# Patient Record
Sex: FEMALE | Race: WHITE | NOT HISPANIC OR LATINO | Employment: FULL TIME | URBAN - METROPOLITAN AREA
[De-identification: names, ages, dates, MRNs, and addresses within clinical notes are randomized per-mention and may not be internally consistent; named-entity substitution may affect disease eponyms.]

---

## 2022-11-07 ENCOUNTER — HOSPITAL ENCOUNTER (EMERGENCY)
Facility: HOSPITAL | Age: 37
Discharge: HOME | End: 2022-11-07
Attending: EMERGENCY MEDICINE

## 2022-11-07 ENCOUNTER — APPOINTMENT (EMERGENCY)
Dept: RADIOLOGY | Facility: HOSPITAL | Age: 37
End: 2022-11-07

## 2022-11-07 VITALS
HEART RATE: 82 BPM | SYSTOLIC BLOOD PRESSURE: 115 MMHG | TEMPERATURE: 98.4 F | DIASTOLIC BLOOD PRESSURE: 69 MMHG | RESPIRATION RATE: 16 BRPM | WEIGHT: 87.3 LBS | OXYGEN SATURATION: 99 %

## 2022-11-07 DIAGNOSIS — K52.9 COLITIS: ICD-10-CM

## 2022-11-07 DIAGNOSIS — R11.2 NAUSEA VOMITING AND DIARRHEA: Primary | ICD-10-CM

## 2022-11-07 DIAGNOSIS — R19.7 NAUSEA VOMITING AND DIARRHEA: Primary | ICD-10-CM

## 2022-11-07 LAB
ALBUMIN SERPL BCP-MCNC: 4.1 G/DL (ref 3.5–5)
ALP SERPL-CCNC: 67 U/L (ref 46–116)
ALT SERPL W P-5'-P-CCNC: 25 U/L (ref 12–78)
ANION GAP SERPL CALCULATED.3IONS-SCNC: 13 MMOL/L (ref 4–13)
BACTERIA UR QL AUTO: ABNORMAL /HPF
BASOPHILS # BLD AUTO: 0.04 THOUSANDS/ÂΜL (ref 0–0.1)
BASOPHILS NFR BLD AUTO: 0 % (ref 0–1)
BILIRUB SERPL-MCNC: 0.34 MG/DL (ref 0.2–1)
BILIRUB UR QL STRIP: NEGATIVE
BUN SERPL-MCNC: 4 MG/DL (ref 5–25)
CALCIUM SERPL-MCNC: 8.8 MG/DL (ref 8.3–10.1)
CHLORIDE SERPL-SCNC: 102 MMOL/L (ref 96–108)
CLARITY UR: ABNORMAL
CO2 SERPL-SCNC: 25 MMOL/L (ref 21–32)
COLOR UR: ABNORMAL
CREAT SERPL-MCNC: 0.73 MG/DL (ref 0.6–1.3)
EOSINOPHIL # BLD AUTO: 0.09 THOUSAND/ÂΜL (ref 0–0.61)
EOSINOPHIL NFR BLD AUTO: 1 % (ref 0–6)
ERYTHROCYTE [DISTWIDTH] IN BLOOD BY AUTOMATED COUNT: 12.8 % (ref 11.6–15.1)
EXT PREG TEST URINE: NORMAL
EXT. CONTROL ED NAV: NORMAL
GFR SERPL CREATININE-BSD FRML MDRD: 107 ML/MIN/1.73SQ M
GLUCOSE SERPL-MCNC: 83 MG/DL (ref 65–140)
GLUCOSE UR STRIP-MCNC: NEGATIVE MG/DL
HCT VFR BLD AUTO: 39.2 % (ref 34.8–46.1)
HGB BLD-MCNC: 12.8 G/DL (ref 11.5–15.4)
HGB UR QL STRIP.AUTO: ABNORMAL
IMM GRANULOCYTES # BLD AUTO: 0.04 THOUSAND/UL (ref 0–0.2)
IMM GRANULOCYTES NFR BLD AUTO: 0 % (ref 0–2)
KETONES UR STRIP-MCNC: NEGATIVE MG/DL
LEUKOCYTE ESTERASE UR QL STRIP: NEGATIVE
LIPASE SERPL-CCNC: 652 U/L (ref 73–393)
LYMPHOCYTES # BLD AUTO: 2.38 THOUSANDS/ÂΜL (ref 0.6–4.47)
LYMPHOCYTES NFR BLD AUTO: 18 % (ref 14–44)
MCH RBC QN AUTO: 29 PG (ref 26.8–34.3)
MCHC RBC AUTO-ENTMCNC: 32.7 G/DL (ref 31.4–37.4)
MCV RBC AUTO: 89 FL (ref 82–98)
MONOCYTES # BLD AUTO: 1.06 THOUSAND/ÂΜL (ref 0.17–1.22)
MONOCYTES NFR BLD AUTO: 8 % (ref 4–12)
NEUTROPHILS # BLD AUTO: 9.37 THOUSANDS/ÂΜL (ref 1.85–7.62)
NEUTS SEG NFR BLD AUTO: 73 % (ref 43–75)
NITRITE UR QL STRIP: NEGATIVE
NON-SQ EPI CELLS URNS QL MICRO: ABNORMAL /HPF
NRBC BLD AUTO-RTO: 0 /100 WBCS
PH UR STRIP.AUTO: 6 [PH]
PLATELET # BLD AUTO: 364 THOUSANDS/UL (ref 149–390)
PMV BLD AUTO: 9.8 FL (ref 8.9–12.7)
POTASSIUM SERPL-SCNC: 3.5 MMOL/L (ref 3.5–5.3)
PROT SERPL-MCNC: 7.7 G/DL (ref 6.4–8.4)
PROT UR STRIP-MCNC: NEGATIVE MG/DL
RBC # BLD AUTO: 4.41 MILLION/UL (ref 3.81–5.12)
RBC #/AREA URNS AUTO: ABNORMAL /HPF
SARS-COV-2 RNA RESP QL NAA+PROBE: NEGATIVE
SODIUM SERPL-SCNC: 140 MMOL/L (ref 135–147)
SP GR UR STRIP.AUTO: <=1.005 (ref 1–1.03)
UROBILINOGEN UR QL STRIP.AUTO: 0.2 E.U./DL
WBC # BLD AUTO: 12.98 THOUSAND/UL (ref 4.31–10.16)
WBC #/AREA URNS AUTO: ABNORMAL /HPF

## 2022-11-07 RX ORDER — ONDANSETRON 2 MG/ML
4 INJECTION INTRAMUSCULAR; INTRAVENOUS ONCE
Status: DISCONTINUED | OUTPATIENT
Start: 2022-11-07 | End: 2022-11-07 | Stop reason: HOSPADM

## 2022-11-07 RX ORDER — ONDANSETRON 4 MG/1
4 TABLET, FILM COATED ORAL EVERY 6 HOURS PRN
Qty: 20 TABLET | Refills: 0 | Status: SHIPPED | OUTPATIENT
Start: 2022-11-07

## 2022-11-07 RX ADMIN — SODIUM CHLORIDE 1000 ML: 0.9 INJECTION, SOLUTION INTRAVENOUS at 16:27

## 2022-11-07 RX ADMIN — IOHEXOL 100 ML: 350 INJECTION, SOLUTION INTRAVENOUS at 18:13

## 2022-11-07 NOTE — Clinical Note
Laitheric Jacobs was seen and treated in our emergency department on 11/7/2022  Diagnosis:     Goran Hayes  may return to school on return date  She may return on this date: 11/09/2022         If you have any questions or concerns, please don't hesitate to call        Chata Arrieta RN    ______________________________           _______________          _______________  Hospital Representative                              Date                                Time

## 2022-11-07 NOTE — ED PROVIDER NOTES
History  Chief Complaint   Patient presents with   • Vomiting     Exposed to  at home that tested positive for covid on oct 30, states she has been feeling sick since about the 29th  Primary symptoms are vomiting and diarrhea     31 yo female c/o vomiting and diarrhea that started 10 days ago  Was ok for a few days then got worse and is having nausea every day with vomiting and watery stools at night  She is concerned about a blockage because she feels like the stools are "coming out a pinhole"  She has a history of groin hernia repair in the past   She says she is not eating and can't hold anything down  She has lost weight - normally weighs 95 pounds - but she has had this weight loss before and her PCP just does bloodwork and nothing else  No fever   did have covid recently but she says home test is negative  History provided by:  Patient   used: No    Vomiting  Associated symptoms: diarrhea    Associated symptoms: no abdominal pain, no cough, no fever, no headaches and no myalgias        None       Past Medical History:   Diagnosis Date   • IBS (irritable bowel syndrome)        Past Surgical History:   Procedure Laterality Date   • APPENDECTOMY         No family history on file  I have reviewed and agree with the history as documented  E-Cigarette/Vaping   • E-Cigarette Use Never User      E-Cigarette/Vaping Substances     Social History     Tobacco Use   • Smoking status: Never Smoker   • Smokeless tobacco: Never Used   Vaping Use   • Vaping Use: Never used   Substance Use Topics   • Alcohol use: Yes     Comment: rare       Review of Systems   Constitutional: Negative  Negative for fever  HENT: Negative  Eyes: Negative  Respiratory: Negative  Negative for cough and shortness of breath  Cardiovascular: Negative  Negative for chest pain  Gastrointestinal: Positive for diarrhea, nausea and vomiting  Negative for abdominal pain     Genitourinary: Negative  Negative for dysuria and flank pain  Musculoskeletal: Negative  Negative for back pain and myalgias  Skin: Negative  Negative for rash  Neurological: Negative  Negative for dizziness and headaches  Hematological: Does not bruise/bleed easily  Psychiatric/Behavioral: Negative  All other systems reviewed and are negative  Physical Exam  Physical Exam  Vitals and nursing note reviewed  Constitutional:       General: She is not in acute distress  Appearance: She is well-developed  She is not ill-appearing or diaphoretic  Comments: Weight 87 pounds   HENT:      Head: Normocephalic and atraumatic  Right Ear: External ear normal       Left Ear: External ear normal    Eyes:      General: No scleral icterus  Conjunctiva/sclera: Conjunctivae normal    Cardiovascular:      Rate and Rhythm: Normal rate and regular rhythm  Heart sounds: Normal heart sounds  No murmur heard  Pulmonary:      Effort: Pulmonary effort is normal  No respiratory distress  Breath sounds: Normal breath sounds  Abdominal:      General: Bowel sounds are normal  There is no distension  Palpations: Abdomen is soft  Tenderness: There is no abdominal tenderness  There is no right CVA tenderness, left CVA tenderness or guarding  Musculoskeletal:         General: No deformity  Normal range of motion  Cervical back: Normal range of motion and neck supple  Right lower leg: No edema  Left lower leg: No edema  Skin:     General: Skin is warm and dry  Coloration: Skin is not pale  Findings: No rash  Neurological:      General: No focal deficit present  Mental Status: She is alert and oriented to person, place, and time  Cranial Nerves: No cranial nerve deficit     Psychiatric:         Mood and Affect: Mood normal          Behavior: Behavior normal          Vital Signs  ED Triage Vitals [11/07/22 1338]   Temperature Pulse Respirations Blood Pressure SpO2 98 4 °F (36 9 °C) 78 16 122/91 100 %      Temp Source Heart Rate Source Patient Position - Orthostatic VS BP Location FiO2 (%)   Tympanic Monitor Sitting Right arm --      Pain Score       No Pain           Vitals:    11/07/22 1338 11/07/22 1937   BP: 122/91 115/69   Pulse: 78 82   Patient Position - Orthostatic VS: Sitting Lying         Visual Acuity      ED Medications  Medications   ondansetron (ZOFRAN) injection 4 mg (4 mg Intravenous Not Given 11/7/22 1637)   sodium chloride 0 9 % bolus 1,000 mL (0 mL Intravenous Stopped 11/7/22 1935)   barium (READI-CAT 2) suspension 900 mL (900 mL Oral Given 11/7/22 1633)   iohexol (OMNIPAQUE) 350 MG/ML injection (SINGLE-DOSE) 100 mL (100 mL Intravenous Given 11/7/22 1813)       Diagnostic Studies  Results Reviewed     Procedure Component Value Units Date/Time    Comprehensive metabolic panel [[id number]]  (Abnormal) Collected: 11/07/22 1629    Lab Status: Final result Specimen: Blood Updated: 11/07/22 1721     Sodium 140 mmol/L      Potassium 3 5 mmol/L      Chloride 102 mmol/L      CO2 25 mmol/L      ANION GAP 13 mmol/L      BUN 4 mg/dL      Creatinine 0 73 mg/dL      Glucose 83 mg/dL      Calcium 8 8 mg/dL      AST --     ALT 25 U/L      Alkaline Phosphatase 67 U/L      Total Protein 7 7 g/dL      Albumin 4 1 g/dL      Total Bilirubin 0 34 mg/dL      eGFR 107 ml/min/1 73sq m     Narrative:      [place] guidelines for Chronic Kidney Disease (CKD):   •  Stage 1 with normal or high GFR (GFR > 90 mL/min/1 73 square meters)  •  Stage 2 Mild CKD (GFR = 60-89 mL/min/1 73 square meters)  •  Stage 3A Moderate CKD (GFR = 45-59 mL/min/1 73 square meters)  •  Stage 3B Moderate CKD (GFR = 30-44 mL/min/1 73 square meters)  •  Stage 4 Severe CKD (GFR = 15-29 mL/min/1 73 square meters)  •  Stage 5 End Stage CKD (GFR <15 mL/min/1 73 square meters)  Note: GFR calculation is accurate only with a steady state creatinine    COVID only [[id number]]  (Normal) Collected: 11/07/22 1629    Lab Status: Final result Specimen: Nares from Nose Updated: 11/07/22 1716     SARS-CoV-2 Negative    Narrative:      FOR PEDIATRIC PATIENTS - copy/paste COVID Guidelines URL to browser: [email or web address]    SARS-CoV-2 assay is a Nucleic Acid Amplification assay intended for the  qualitative detection of nucleic acid from SARS-CoV-2 in nasopharyngeal  swabs  Results are for the presumptive identification of SARS-CoV-2 RNA  Positive results are indicative of infection with SARS-CoV-2, the virus  causing COVID-19, but do not rule out bacterial infection or co-infection  with other viruses  Laboratories within the [place] and its  territories are required to report all positive results to the appropriate  public health authorities  Negative results do not preclude SARS-CoV-2  infection and should not be used as the sole basis for treatment or other  patient management decisions  Negative results must be combined with  clinical observations, patient history, and epidemiological information  This test has not been FDA cleared or approved  This test has been authorized by FDA under an Emergency Use Authorization  (EUA)  This test is only authorized for the duration of time the  declaration that circumstances exist justifying the authorization of the  emergency use of an in vitro diagnostic tests for detection of SARS-CoV-2  virus and/or diagnosis of COVID-19 infection under section 564(b)(1) of  the Act, 21 U  S C  505FVH-4(F)(6), unless the authorization is terminated  or revoked sooner  The test has been validated but independent review by FDA  and CLIA is pending  Test performed using [employer] Gene[employer]pert: This RT-PCR assay targets N2,  a region unique to SARS-CoV-2  A conserved region in the E-gene was chosen  for pan-Sarbecovirus detection which includes SARS-CoV-2      According to CMS-2020-01-R, this platform meets the definition of high-throughput technology      Urine Microscopic [[id number]]  (Abnormal) Collected: 11/07/22 1641    Lab Status: Final result Specimen: Urine, Clean Catch Updated: 11/07/22 1711     RBC, UA 2-4 /hpf      WBC, UA 1-2 /hpf      Epithelial Cells Moderate /hpf      Bacteria, UA Occasional /hpf     UA (URINE) with reflex to Scope [[id number]]  (Abnormal) Collected: 11/07/22 1641    Lab Status: Final result Specimen: Urine, Clean Catch Updated: 11/07/22 1701     Color, UA Light Yellow     Clarity, UA Slightly Cloudy     Specific Gravity, UA <=1 005     pH, UA 6 0     Leukocytes, UA Negative     Nitrite, UA Negative     Protein, UA Negative mg/dl      Glucose, UA Negative mg/dl      Ketones, UA Negative mg/dl      Urobilinogen, UA 0 2 E U /dl      Bilirubin, UA Negative     Occult Blood, UA Small    Lipase [[id number]]  (Abnormal) Collected: 11/07/22 1629    Lab Status: Final result Specimen: Blood Updated: 11/07/22 1656     Lipase 652 u/L     CBC and differential [[id number]]  (Abnormal) Collected: 11/07/22 1629    Lab Status: Final result Specimen: Blood Updated: 11/07/22 1638     WBC 12 98 Thousand/uL      RBC 4 41 Million/uL      Hemoglobin 12 8 g/dL      Hematocrit 39 2 %      MCV 89 fL      MCH 29 0 pg      MCHC 32 7 g/dL      RDW 12 8 %      MPV 9 8 fL      Platelets 240 Thousands/uL      nRBC 0 /100 WBCs      Neutrophils Relative 73 %      Immat GRANS % 0 %      Lymphocytes Relative 18 %      Monocytes Relative 8 %      Eosinophils Relative 1 %      Basophils Relative 0 %      Neutrophils Absolute 9 37 Thousands/µL      Immature Grans Absolute 0 04 Thousand/uL      Lymphocytes Absolute 2 38 Thousands/µL      Monocytes Absolute 1 06 Thousand/µL      Eosinophils Absolute 0 09 Thousand/µL      Basophils Absolute 0 04 Thousands/µL     POCT pregnancy, urine [[id number]]  (Normal) Resulted: 11/07/22 1635    Lab Status: Final result Updated: 11/07/22 1635     EXT PREG TEST UR (Ref: Negative) nrgative     Control valid                 CT abdomen pelvis with contrast   Final Result by Josee Friedman DO (11/07 1947)   Partially liquefied stool throughout the large and small bowel  Some minimal mucosal enhancement sigmoid and rectum which may suggest some mild colitis  No bowel wall pneumatosis or mesenteric/portal venous gas to suggest bowel ischemia or bowel    infarct  No significant bowel dilatation to suggest bowel obstruction  Consider follow-up abdominal x-ray in 12 hours to confirm passage of oral contrast material          Workstation performed: [id number]                    Procedures  Procedures         ED Course                                             MDM  Number of Diagnoses or Management Options  Colitis  Nausea vomiting and diarrhea  Diagnosis management comments: CT scan negative for obstruction, shows diarrheal disease/mild colitis  Will discharge, do outpt  Stool cultures since symptoms going on for 10 days already and advised follow up outpt  GI and PCP  Disposition  Final diagnoses:   Nausea vomiting and diarrhea   Colitis     Time reflects when diagnosis was documented in both MDM as applicable and the Disposition within this note     Time User Action Codes Description Comment    56/9/2273  5:31 PM Love Borne Add [M94 3,  R19 7] Nausea vomiting and diarrhea     56/6/7917  4:09 PM Love Borne Add [G19 8] Colitis       ED Disposition     ED Disposition   Discharge    Condition   Stable    Date/Time   Mon Nov 7, 2022  8:01 PM    [address] 29 discharge to home/self care                 Follow-up Information     Follow up With Specialties Details Why Contact Info    Luisa Daniels MD Gastroenterology Schedule an appointment as soon as possible for a visit   [place] [id number]  [phone]            Patient's Medications   Discharge Prescriptions    ONDANSETRON (ZOFRAN) 4 MG TABLET    Take 1 tablet (4 mg total) by mouth every 6 (six) hours as needed for nausea or vomiting       Start Date: 11/7/2022 End Date: --       Order Dose: 4 mg       Quantity: 20 tablet    Refills: 0       Outpatient Discharge Orders   Stool Enteric Bacterial Panel by PCR   Standing Status: Future Standing Exp   Date: 11/07/23       PDMP Review     None          ED Provider  Electronically Signed by           Melissa Huitron MD  35/47/98 5534

## 2022-11-07 NOTE — ED NOTES
Admits to smoking marijuana almost every day, informed that this may cause her to vomit       Rafi Gatica, MIKY  11/07/22 8817

## 2022-11-08 ENCOUNTER — APPOINTMENT (OUTPATIENT)
Dept: LAB | Facility: HOSPITAL | Age: 37
End: 2022-11-08

## 2022-11-08 DIAGNOSIS — K52.9 COLITIS: ICD-10-CM

## 2022-11-08 NOTE — DISCHARGE INSTRUCTIONS
Attempted to call patient. Patients  answered the phone and stated that patient is unavailable. She is at work until 5. Patients  stated that he will try to get ahold of her and have her give our office a call.    Your CT scan shows diarrheal disease with mild colitis  Try to keep hydrated and take in some nutrition  Use immodium as needed for diarrhea and the zofra as needed for nausea  Make a follow up appointment with your doctor and the GI specialist for further evaluation beyond what we can do in the ER

## 2022-11-09 LAB
CAMPYLOBACTER DNA SPEC NAA+PROBE: NORMAL
SALMONELLA DNA SPEC QL NAA+PROBE: NORMAL
SHIGA TOXIN STX GENE SPEC NAA+PROBE: NORMAL
SHIGELLA DNA SPEC QL NAA+PROBE: NORMAL

## 2023-05-05 ENCOUNTER — OFFICE VISIT (OUTPATIENT)
Dept: ENDOCRINOLOGY | Facility: CLINIC | Age: 38
End: 2023-05-05

## 2023-05-05 VITALS
SYSTOLIC BLOOD PRESSURE: 110 MMHG | HEART RATE: 67 BPM | DIASTOLIC BLOOD PRESSURE: 70 MMHG | WEIGHT: 96.6 LBS | BODY MASS INDEX: 16.49 KG/M2 | HEIGHT: 64 IN

## 2023-05-05 DIAGNOSIS — R63.6 UNDERWEIGHT: ICD-10-CM

## 2023-05-05 DIAGNOSIS — Z83.49 FAMILY HISTORY OF THYROID DISEASE: ICD-10-CM

## 2023-05-05 DIAGNOSIS — Z86.32 HISTORY OF GESTATIONAL DIABETES: ICD-10-CM

## 2023-05-05 DIAGNOSIS — R61 NIGHT SWEATS: Primary | ICD-10-CM

## 2023-05-05 PROBLEM — Z87.59 H/O PRE-ECLAMPSIA: Status: ACTIVE | Noted: 2023-05-05

## 2023-05-05 RX ORDER — FAMCICLOVIR 500 MG/1
TABLET ORAL
COMMUNITY
Start: 2023-02-16

## 2023-05-05 NOTE — PROGRESS NOTES
" Andre Gomes 41 y o  female MRN: [id number]    Encounter: [id number]      Assessment/Plan     1  Underweight, BMI 16 8, difficulty in gaining weight  2  Night sweats  3  Family History of thyroid disease  -will check thyroid function test along with thyroid antibodies, CBC, CMP  -Check A1c  If all labs are within normal limits, it would be important to check thyroid function test during a period that patient has loss of appetite, worsening of night sweats, insomnia, weight loss    5  History of gestational diabetes mellitus, preeclampsia    CC:  Night sweats, difficulty gaining weight     History of Present Illness     HPI:  Andre Gomes is a 41 y o  female who is here for a new consult for concerns related to the thyroid     Since her early 25s has noticed  Period where she has loss of appetite, night sweats, insomnia  weight loss 95 -- > 85 lbs , takes af ew months to get back to \"normal\"   approx   Has not been able to gain weight expect for during pregnancy when she was max 130 lbs     approx once a year, sometimes more frequent  Last 1 5 year ago   Has never been to an endocrinologist   In Nov 2022 - lost weight due to a GI infection  Right now appetite is good, weight stable,   Sleep has been worse well since her pregnancy, delivered in 2018   hair thinning, Brittle nails   No swelling in the neck  (felt is was swollen to patient in 2019, got ok after on its own in 1 5 weeks)   No obstructive symptoms   Itchy, watery eyes; at times feels like there is a trail behind whatever object she is seeing   No peripheral vision loss  night sweats are more constant 4-5 times a week   Occasional headaches     No excessive iodine   Eats 2-3 meals a day   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, is active; no additional exercise     IUD in place and has not had a period recently   Had regular cycles prior, had dysmenorrhea    No difficulty in conceiving, had GD and pre-eclampsia during " "pregnancy; no history of essential hypertension    Mother with h/op hypothyroidism   Maternal aunty and GM also with thyroid disease   No Thyroid cancer    All other systems were reviewed and are negative  Review of Systems    Historical Information   Past Medical History:   Diagnosis Date    IBS (irritable bowel syndrome)      Past Surgical History:   Procedure Laterality Date    APPENDECTOMY       Social History   Social History     Substance and Sexual Activity   Alcohol Use Yes    Comment: rare     Social History     Substance and Sexual Activity   Drug Use Yes    Types: Marijuana    Comment: almost daily     Social History     Tobacco Use   Smoking Status Never   Smokeless Tobacco Never     Family History:   Family History   Problem Relation Age of Onset    Hypothyroidism Mother     Hypertension Father        Meds/Allergies   Current Outpatient Medications   Medication Sig Dispense Refill    famciclovir (FAMVIR) 500 mg tablet TAKE 3 TABLETS BY ORAL ROUTE ONCE AT THE FIRST SIGN OF OUTBREAK      ondansetron (ZOFRAN) 4 mg tablet Take 1 tablet (4 mg total) by mouth every 6 (six) hours as needed for nausea or vomiting (Patient not taking: Reported on 5/5/2023) 20 tablet 0     No current facility-administered medications for this visit  No Known Allergies    Objective   Vitals: Blood pressure 110/70, pulse 67, height 5' 3 5\" (1 613 m), weight 43 8 kg (96 lb 9 6 oz)  Physical Exam  Constitutional:       Appearance: She is well-developed  HENT:      Head: Normocephalic and atraumatic  Eyes:      Conjunctiva/sclera: Conjunctivae normal       Pupils: Pupils are equal, round, and reactive to light  Neck:      Thyroid: No thyromegaly  Comments: No thyromegaly   Nontender, no nodules appreciated on palpation   Cardiovascular:      Rate and Rhythm: Normal rate and regular rhythm  Heart sounds: Normal heart sounds  No murmur heard    Pulmonary:      Effort: Pulmonary effort is normal       " "Breath sounds: Normal breath sounds  No wheezing  Abdominal:      General: There is no distension  Palpations: Abdomen is soft  Tenderness: There is no abdominal tenderness  Musculoskeletal:         General: Normal range of motion  Cervical back: Normal range of motion and neck supple  Skin:     General: Skin is warm  Neurological:      Mental Status: She is alert and oriented to person, place, and time  Deep Tendon Reflexes: Reflexes normal       Comments: No tremors on the outstretched arms    Psychiatric:         Behavior: Behavior normal         The history was obtained from the review of the chart, patient  Lab Results:      Lab Results   Component Value Date    WBC 12 98 (H) 11/07/2022    HGB 12 8 11/07/2022    HCT 39 2 11/07/2022    MCV 89 11/07/2022     11/07/2022     Lab Results   Component Value Date    CREATININE 0 73 11/07/2022    BUN 4 (L) 11/07/2022    K 3 5 11/07/2022     11/07/2022    CO2 25 11/07/2022     No results found for: HGBA1C      Imaging Studies:       I have personally reviewed pertinent reports  Portions of the record may have been created with voice recognition software  Occasional wrong word or \"sound a like\" substitutions may have occurred due to the inherent limitations of voice recognition software  Read the chart carefully and recognize, using context, where substitutions have occurred      "

## 2023-05-13 LAB
ALBUMIN SERPL-MCNC: 4.8 G/DL (ref 3.8–4.8)
ALBUMIN/GLOB SERPL: 2.5 {RATIO} (ref 1.2–2.2)
ALP SERPL-CCNC: 44 IU/L (ref 44–121)
ALT SERPL-CCNC: 19 IU/L (ref 0–32)
AST SERPL-CCNC: 21 IU/L (ref 0–40)
BASOPHILS # BLD AUTO: 0 X10E3/UL (ref 0–0.2)
BASOPHILS NFR BLD AUTO: 0 %
BILIRUB SERPL-MCNC: 0.4 MG/DL (ref 0–1.2)
BUN SERPL-MCNC: 12 MG/DL (ref 6–20)
BUN/CREAT SERPL: 16 (ref 9–23)
CALCIUM SERPL-MCNC: 9 MG/DL (ref 8.7–10.2)
CHLORIDE SERPL-SCNC: 103 MMOL/L (ref 96–106)
CO2 SERPL-SCNC: 22 MMOL/L (ref 20–29)
CREAT SERPL-MCNC: 0.74 MG/DL (ref 0.57–1)
EGFR: 107 ML/MIN/1.73
EOSINOPHIL # BLD AUTO: 0.1 X10E3/UL (ref 0–0.4)
EOSINOPHIL NFR BLD AUTO: 1 %
ERYTHROCYTE [DISTWIDTH] IN BLOOD BY AUTOMATED COUNT: 12.9 % (ref 11.7–15.4)
GLOBULIN SER-MCNC: 1.9 G/DL (ref 1.5–4.5)
GLUCOSE SERPL-MCNC: 81 MG/DL (ref 70–99)
HCT VFR BLD AUTO: 36.9 % (ref 34–46.6)
HGB BLD-MCNC: 12.3 G/DL (ref 11.1–15.9)
IMM GRANULOCYTES # BLD: 0 X10E3/UL (ref 0–0.1)
IMM GRANULOCYTES NFR BLD: 0 %
LYMPHOCYTES # BLD AUTO: 1.7 X10E3/UL (ref 0.7–3.1)
LYMPHOCYTES NFR BLD AUTO: 28 %
MCH RBC QN AUTO: 29.4 PG (ref 26.6–33)
MCHC RBC AUTO-ENTMCNC: 33.3 G/DL (ref 31.5–35.7)
MCV RBC AUTO: 88 FL (ref 79–97)
MONOCYTES # BLD AUTO: 0.4 X10E3/UL (ref 0.1–0.9)
MONOCYTES NFR BLD AUTO: 6 %
NEUTROPHILS # BLD AUTO: 4 X10E3/UL (ref 1.4–7)
NEUTROPHILS NFR BLD AUTO: 65 %
PLATELET # BLD AUTO: 278 X10E3/UL (ref 150–450)
POTASSIUM SERPL-SCNC: 4.7 MMOL/L (ref 3.5–5.2)
PROT SERPL-MCNC: 6.7 G/DL (ref 6–8.5)
RBC # BLD AUTO: 4.18 X10E6/UL (ref 3.77–5.28)
SODIUM SERPL-SCNC: 139 MMOL/L (ref 134–144)
T3 SERPL-MCNC: 99 NG/DL (ref 71–180)
T4 FREE SERPL-MCNC: 1.11 NG/DL (ref 0.82–1.77)
THYROGLOB AB SERPL-ACNC: 21.3 IU/ML (ref 0–0.9)
THYROPEROXIDASE AB SERPL-ACNC: <9 IU/ML (ref 0–34)
TSH SERPL DL<=0.005 MIU/L-ACNC: 1.35 UIU/ML (ref 0.45–4.5)
TSI SER-ACNC: <0.1 IU/L (ref 0–0.55)
WBC # BLD AUTO: 6.2 X10E3/UL (ref 3.4–10.8)

## 2023-05-16 ENCOUNTER — TELEPHONE (OUTPATIENT)
Dept: ENDOCRINOLOGY | Facility: CLINIC | Age: 38
End: 2023-05-16

## 2023-05-16 DIAGNOSIS — Z83.49 FAMILY HISTORY OF THYROID DISEASE: Primary | ICD-10-CM

## 2023-05-16 NOTE — TELEPHONE ENCOUNTER
Though less likely, to further evaluate night sweats, please order plasma catecholamines, metanephrines  Also check vitamin D level   Recommend following up with primary care regarding shortness of breath while singing  If it is voice change, may need to see ENT

## 2023-05-16 NOTE — TELEPHONE ENCOUNTER
Spoke with patient and reviewed lab results  She understood    She is wondering if there is something that can be done for the night sweats and excessive tiredness  She is also SOB when she sings which she does a lot at work

## 2023-05-17 DIAGNOSIS — R61 NIGHT SWEATS: Primary | ICD-10-CM

## 2023-05-17 DIAGNOSIS — E55.9 VITAMIN D DEFICIENCY: ICD-10-CM

## 2023-05-17 NOTE — TELEPHONE ENCOUNTER
Spoke with patient and reviewed recommendations and ordered labs  She understood    Wanted you to be aware that a cousin of hers had a rare thyroid cancer  Not sure if that would be relevant

## 2023-05-19 DIAGNOSIS — Z83.49 FAMILY HISTORY OF THYROID DISEASE: Primary | ICD-10-CM

## 2023-05-19 NOTE — TELEPHONE ENCOUNTER
Please ask patient if she knows details of what kind of thyroid cancer it was  Please add on calcitonin, ESR/CRP to the additional labs to be done   She should also follow up with her primary care physician

## 2023-05-31 LAB
25(OH)D3+25(OH)D2 SERPL-MCNC: 42.6 NG/ML (ref 30–100)
CALCIT SERPL-MCNC: <2 PG/ML (ref 0–5)
CRP SERPL-MCNC: <1 MG/L (ref 0–10)
DOPAMINE 24H UR-MRATE: <30 PG/ML (ref 0–48)
EPINEPH PLAS-MCNC: 34 PG/ML (ref 0–62)
ERYTHROCYTE [SEDIMENTATION RATE] IN BLOOD BY WESTERGREN METHOD: 9 MM/HR (ref 0–32)
METANEPH FREE SERPL-MCNC: 28.9 PG/ML (ref 0–88)
NOREPINEPH PLAS-MCNC: 285 PG/ML (ref 0–874)
NORMETANEPHRINE SERPL-MCNC: 69.8 PG/ML (ref 0–210.1)

## 2023-11-01 LAB
T4 FREE SERPL-MCNC: 1.29 NG/DL (ref 0.82–1.77)
TSH SERPL DL<=0.005 MIU/L-ACNC: 2.03 UIU/ML (ref 0.45–4.5)

## 2023-11-14 ENCOUNTER — OFFICE VISIT (OUTPATIENT)
Dept: ENDOCRINOLOGY | Facility: CLINIC | Age: 38
End: 2023-11-14
Payer: COMMERCIAL

## 2023-11-14 VITALS
WEIGHT: 95 LBS | SYSTOLIC BLOOD PRESSURE: 122 MMHG | HEART RATE: 93 BPM | OXYGEN SATURATION: 95 % | DIASTOLIC BLOOD PRESSURE: 82 MMHG | BODY MASS INDEX: 16.56 KG/M2

## 2023-11-14 DIAGNOSIS — R53.83 OTHER FATIGUE: ICD-10-CM

## 2023-11-14 DIAGNOSIS — E06.3 HASHIMOTO'S THYROIDITIS: ICD-10-CM

## 2023-11-14 DIAGNOSIS — R63.6 UNDERWEIGHT: ICD-10-CM

## 2023-11-14 DIAGNOSIS — R61 NIGHT SWEATS: Primary | ICD-10-CM

## 2023-11-14 PROCEDURE — 99213 OFFICE O/P EST LOW 20 MIN: CPT | Performed by: NURSE PRACTITIONER

## 2023-11-14 NOTE — ASSESSMENT & PLAN NOTE
Recommended follow-up with GI for symptoms of intermittent pain after swallowing and decreased appetite if endocrine work-up is inconclusive for the symptoms. Follow-up in 3 months with the above work-up.

## 2023-11-14 NOTE — PROGRESS NOTES
Established Patient Progress Note       CC: Night sweats, difficulty gaining weight       Impression & Plan:    Problem List Items Addressed This Visit          Endocrine    Hashimoto's thyroiditis     History of positive thyroid antibodies. Biochemically euthyroid from October 2023. Relevant Orders    TSH, 3rd generation    T4, free- Lab Collect       Other    Night sweats - Primary     We will repeat metanephrines and catecholamines in a period of symptoms. Relevant Orders    Metanephrine, Fractionated Plasma Free    Catecholamines, fractionated, plasma    Underweight     Recommended follow-up with GI for symptoms of intermittent pain after swallowing and decreased appetite if endocrine work-up is inconclusive for the symptoms. Follow-up in 3 months with the above work-up. Other Visit Diagnoses       Other fatigue        Relevant Orders    Iron Panel (Includes Ferritin, Iron Sat%, Iron, and TIBC)            Orders Placed This Encounter   Procedures    Metanephrine, Fractionated Plasma Free     This is a Patient Instruction: This test requires patient fasting for 10-12 hours or longer. The patient should be laying down for at least 15 minutes before and during sample collection. Standing Status:   Future     Standing Expiration Date:   11/14/2024    Catecholamines, fractionated, plasma     This is a patient instruction: This test requires fasting for four or more hours without smoking. Avoid walnuts, bananas and [place] one week prior to test collection. Other drug interference may occur including epinephrine and epinephrine-like drugs (eg, nose drops, sinus and cough preparations, bronchodilators, appetite suppressants). Test is unreliable in subjects on levodopa or methenamine mandelate. Avoid patient stress. Patient should lie down in quiet surroundings for at least 30 minutes before and during sample collection.      Standing Status:   Future     Standing Expiration Date: 11/14/2024    TSH, 3rd generation     This is a patient instruction: This test is non-fasting. Please drink two glasses of water morning of bloodwork. Standing Status:   Future     Standing Expiration Date:   5/14/2024    T4, free- Lab Collect     Standing Status:   Future     Standing Expiration Date:   11/14/2024       History of Present Illness:     Mikael Nesbitt is a 41 y.o. female presents for follow-up of concerns related to thyroid function after initial consultation with Dr. Katlyn Dickerson in May 2023. Some of the concerns that she has been experiencing is that since her early 25s she has noticed loss of appetite, night sweats, insomnia The only time where she was able to gain weight was during pregnancy where she was at maximum 130 pounds. Currently at 95 pounds. Maternal family history of thyroid disease. Denies personal or family history of thyroid cancer. Continues to experience periods with night sweats, headaches, feeling irritable, and hot flashes. Has current baseline weight of 95 pounds however has been down as low as 85 pounds while experiencing periods of the above symptoms. She notes that she has some decreased appetite and occasionally after eating she will experience a little bit of midsternal chest comfort within a few minutes of eating. She has not been followed by GI recently however did have an episode similar in 2015 per patient report where she did have an EGD and colonoscopy which did not identify any anatomical cause other than tonsil stones. She did have a modified barium swallow as well in 2017 with an unremarkable exam.  The EGD and GI records records are not available in our electronic health system.     Has IUD, no regular periods     Patient Active Problem List   Diagnosis    Night sweats    Underweight    Family history of thyroid disease    H/O pre-eclampsia    History of gestational diabetes    Hashimoto's thyroiditis      Past Medical History:   Diagnosis Date    IBS (irritable bowel syndrome)       Past Surgical History:   Procedure Laterality Date    APPENDECTOMY        Family History   Problem Relation Age of Onset    Hypothyroidism Mother     Hypertension Father      Social History     Tobacco Use    Smoking status: Never    Smokeless tobacco: Never   Substance Use Topics    Alcohol use: Yes     Comment: rare     No Known Allergies    Current Outpatient Medications:     famciclovir (FAMVIR) 500 mg tablet, TAKE 3 TABLETS BY ORAL ROUTE ONCE AT THE FIRST SIGN OF OUTBREAK, Disp: , Rfl:     ondansetron (ZOFRAN) 4 mg tablet, Take 1 tablet (4 mg total) by mouth every 6 (six) hours as needed for nausea or vomiting (Patient not taking: Reported on 5/5/2023), Disp: 20 tablet, Rfl: 0    Review of Systems  See HPI  All other systems reviewed and are negative. Physical Exam:  Body mass index is 16.56 kg/m². /82 (BP Location: Right arm, Patient Position: Sitting, Cuff Size: Standard)   Pulse 93   Wt 43.1 kg (95 lb)   SpO2 95%   BMI 16.56 kg/m²    Wt Readings from Last 3 Encounters:   11/14/23 43.1 kg (95 lb)   05/05/23 43.8 kg (96 lb 9.6 oz)   11/07/22 39.6 kg (87 lb 4.8 oz)        Physical Exam  Vitals reviewed. Constitutional:       Appearance: Normal appearance. Cardiovascular:      Rate and Rhythm: Normal rate and regular rhythm. Pulses: Normal pulses. Heart sounds: Normal heart sounds. Pulmonary:      Effort: Pulmonary effort is normal.      Breath sounds: Normal breath sounds. Skin:     General: Skin is warm and dry. Capillary Refill: Capillary refill takes less than 2 seconds. Neurological:      General: No focal deficit present. Mental Status: She is alert and oriented to person, place, and time.    Psychiatric:         Mood and Affect: Mood normal.         Behavior: Behavior normal.       Labs:     Lab Results   Component Value Date    CREATININE 0.74 05/11/2023    CREATININE 0.73 11/07/2022    BUN 12 05/11/2023    K 4.7 05/11/2023    CL 103 05/11/2023    CO2 22 05/11/2023     eGFR   Date Value Ref Range Status   05/11/2023 107 >59 mL/min/1.73 Final   11/07/2022 107 ml/min/1.73sq m Final       No results found for: "CHOL", "HDL", "TRIG", "CHOLHDL"    Lab Results   Component Value Date    ALT 19 05/11/2023    AST 21 05/11/2023    ALKPHOS 67 11/07/2022       Lab Results   Component Value Date    FREET4 1.29 10/31/2023     Discussed with the patient and all questioned fully answered. She will call me if any problems arise. Follow-up appointment in 3 months.      Counseled patient on diagnostic results, prognosis, risk and benefit of treatment options, instruction for management, importance of treatment compliance, Risk  factor reduction and impressions    ANA Alvarez

## 2024-02-09 ENCOUNTER — TELEPHONE (OUTPATIENT)
Dept: ENDOCRINOLOGY | Facility: CLINIC | Age: 39
End: 2024-02-09

## 2024-02-09 NOTE — TELEPHONE ENCOUNTER
Patient called and wished to reschedule due to taking cough medication. She was unable to do labs from last visit.I offered her to keep her appointment but she decided to reschedule to 3- @ 2:30 with Lian Malloy

## 2024-03-11 ENCOUNTER — TELEPHONE (OUTPATIENT)
Dept: ENDOCRINOLOGY | Facility: CLINIC | Age: 39
End: 2024-03-11

## 2024-03-11 NOTE — TELEPHONE ENCOUNTER
Called patient and left message to call us to reschedule. I called her due to message that was left canceling day of appointment.